# Patient Record
Sex: MALE | Race: BLACK OR AFRICAN AMERICAN | NOT HISPANIC OR LATINO | Employment: OTHER | URBAN - METROPOLITAN AREA
[De-identification: names, ages, dates, MRNs, and addresses within clinical notes are randomized per-mention and may not be internally consistent; named-entity substitution may affect disease eponyms.]

---

## 2024-07-12 ENCOUNTER — APPOINTMENT (OUTPATIENT)
Dept: GENERAL RADIOLOGY | Facility: CLINIC | Age: 29
End: 2024-07-12
Attending: EMERGENCY MEDICINE
Payer: COMMERCIAL

## 2024-07-12 ENCOUNTER — HOSPITAL ENCOUNTER (EMERGENCY)
Facility: CLINIC | Age: 29
Discharge: HOME | End: 2024-07-13
Attending: EMERGENCY MEDICINE | Admitting: EMERGENCY MEDICINE
Payer: COMMERCIAL

## 2024-07-12 DIAGNOSIS — S29.012A MUSCLE STRAIN OF LEFT UPPER BACK, INITIAL ENCOUNTER: ICD-10-CM

## 2024-07-12 DIAGNOSIS — M62.830 BACK MUSCLE SPASM: ICD-10-CM

## 2024-07-12 PROCEDURE — 99283 EMERGENCY DEPT VISIT LOW MDM: CPT | Mod: 25

## 2024-07-12 PROCEDURE — 250N000013 HC RX MED GY IP 250 OP 250 PS 637: Performed by: EMERGENCY MEDICINE

## 2024-07-12 PROCEDURE — 71046 X-RAY EXAM CHEST 2 VIEWS: CPT

## 2024-07-12 RX ORDER — IBUPROFEN 600 MG/1
600 TABLET, FILM COATED ORAL ONCE
Status: COMPLETED | OUTPATIENT
Start: 2024-07-12 | End: 2024-07-12

## 2024-07-12 RX ORDER — LIDOCAINE 4 G/G
1 PATCH TOPICAL ONCE
Status: DISCONTINUED | OUTPATIENT
Start: 2024-07-12 | End: 2024-07-13 | Stop reason: HOSPADM

## 2024-07-12 RX ADMIN — LIDOCAINE 1 PATCH: 4 PATCH TOPICAL at 23:34

## 2024-07-12 RX ADMIN — IBUPROFEN 600 MG: 600 TABLET, FILM COATED ORAL at 20:53

## 2024-07-12 ASSESSMENT — COLUMBIA-SUICIDE SEVERITY RATING SCALE - C-SSRS
2. HAVE YOU ACTUALLY HAD ANY THOUGHTS OF KILLING YOURSELF IN THE PAST MONTH?: NO
1. IN THE PAST MONTH, HAVE YOU WISHED YOU WERE DEAD OR WISHED YOU COULD GO TO SLEEP AND NOT WAKE UP?: NO
6. HAVE YOU EVER DONE ANYTHING, STARTED TO DO ANYTHING, OR PREPARED TO DO ANYTHING TO END YOUR LIFE?: NO

## 2024-07-12 ASSESSMENT — ACTIVITIES OF DAILY LIVING (ADL): ADLS_ACUITY_SCORE: 33

## 2024-07-12 NOTE — Clinical Note
Pratima Andrew was seen and treated in our emergency department on 7/12/2024.  He may return to work on 07/15/2024.  No heavy lifting of over 10lbs or repetitive twisting for one week.     If you have any questions or concerns, please don't hesitate to call.      Tayler Barnes MD

## 2024-07-13 VITALS
WEIGHT: 165.34 LBS | SYSTOLIC BLOOD PRESSURE: 129 MMHG | RESPIRATION RATE: 18 BRPM | DIASTOLIC BLOOD PRESSURE: 78 MMHG | OXYGEN SATURATION: 97 % | BODY MASS INDEX: 25.06 KG/M2 | TEMPERATURE: 98 F | HEART RATE: 82 BPM | HEIGHT: 68 IN
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
## 2024-07-13 NOTE — DISCHARGE INSTRUCTIONS
Use ibuprofen (with food) 600mg every 6 hours as needed for pain. You may also use Tylenol 1000 mg up to 4 times a day as needed for pain.  Use ice as this may help with inflammation.  Consider over-the-counter pain relieving ointments as needed.

## 2025-04-15 ENCOUNTER — OFFICE VISIT (OUTPATIENT)
Dept: URGENT CARE | Facility: URGENT CARE | Age: 30
End: 2025-04-15
Payer: COMMERCIAL

## 2025-04-15 VITALS
RESPIRATION RATE: 16 BRPM | OXYGEN SATURATION: 100 % | SYSTOLIC BLOOD PRESSURE: 114 MMHG | DIASTOLIC BLOOD PRESSURE: 77 MMHG | BODY MASS INDEX: 24.55 KG/M2 | WEIGHT: 162 LBS | TEMPERATURE: 98.4 F | HEART RATE: 80 BPM

## 2025-04-15 DIAGNOSIS — J02.9 ACUTE VIRAL PHARYNGITIS: ICD-10-CM

## 2025-04-15 DIAGNOSIS — R68.89 FLU-LIKE SYMPTOMS: Primary | ICD-10-CM

## 2025-04-15 LAB
DEPRECATED S PYO AG THROAT QL EIA: NEGATIVE
FLUAV AG SPEC QL IA: NEGATIVE
FLUBV AG SPEC QL IA: NEGATIVE
S PYO DNA THROAT QL NAA+PROBE: NOT DETECTED

## 2025-04-15 PROCEDURE — 3074F SYST BP LT 130 MM HG: CPT | Performed by: STUDENT IN AN ORGANIZED HEALTH CARE EDUCATION/TRAINING PROGRAM

## 2025-04-15 PROCEDURE — 99203 OFFICE O/P NEW LOW 30 MIN: CPT | Performed by: STUDENT IN AN ORGANIZED HEALTH CARE EDUCATION/TRAINING PROGRAM

## 2025-04-15 PROCEDURE — 87651 STREP A DNA AMP PROBE: CPT | Performed by: STUDENT IN AN ORGANIZED HEALTH CARE EDUCATION/TRAINING PROGRAM

## 2025-04-15 PROCEDURE — 3078F DIAST BP <80 MM HG: CPT | Performed by: STUDENT IN AN ORGANIZED HEALTH CARE EDUCATION/TRAINING PROGRAM

## 2025-04-15 PROCEDURE — 87804 INFLUENZA ASSAY W/OPTIC: CPT | Performed by: PHYSICIAN ASSISTANT

## 2025-04-15 NOTE — PROGRESS NOTES
Assessment & Plan     Flu-like symptoms  Acute viral pharyngitis  Presented with 3 days of body aches, sore throat, cough, headache, objective fevers.  No difficulty breathing.  Rapid flu and strep negative.  Discussed likely viral infection.  Of note patient was treated for latent TB, completed full course several years ago.  Without fever or respiratory symptoms did not order chest x-ray.  -Tylenol and ibuprofen as needed  -Cough drops for cough  -Return precautions discussed             No follow-ups on file.    Elliott Huffman MD  [facility] Andujar is a 30 year old male who presents to clinic today for the following health issues:  Chief Complaint   Patient presents with    Pharyngitis     3 days, cough, fever, body aches, fatigue     Symptoms started 3 days ago.    Body aches, weakness, sore throat, mild cough, headache. Subjective fevers, did not check. Sweating at night. Did have a streak of blood in coughed up sputum, one time.     No N/V/D.     Breathing is fine.     No known sick contacts.     Taking tylenol. No tylenol yet today.     No medical conditions or medications. Previously treated for latent TB.             4/15/2025    11:11 AM   Additional Questions   Roomed by mailssa vidal           Review of Systems        Objective    /77 (BP Location: Right arm, Patient Position: Sitting, Cuff Size: Adult Regular)   Pulse 80   Temp 98.4  F (36.9  C) (Tympanic)   Resp 16   Wt 73.5 kg (162 lb)   SpO2 100%   BMI 24.55 kg/m    Physical Exam   GENERAL: alert and no distress  EYES: Eyes grossly normal to inspection, PERRL and conjunctivae and sclerae normal  HENT: nose and mouth without ulcers or lesions  NECK: no adenopathy, no asymmetry, masses, or scars  RESP: lungs clear to auscultation - no rales, rhonchi or wheezes  CV: regular rate and rhythm, normal S1 S2, no S3 or S4, no murmur, click or rub, no peripheral edema  ABDOMEN: soft, nontender, no  hepatosplenomegaly, no masses   MS: no gross musculoskeletal defects noted, no edema  SKIN: no suspicious lesions or rashes  NEURO:  mentation intact and speech normal  PSYCH: mentation appears normal, affect normal/bright

## 2025-04-15 NOTE — LETTER
4/15/2025    Pratima Andrew   1995        To Whom it May Concern;    Pratima Andrew was seen in clinic on April 15, 2025 for flu-like symptoms. He likely has a viral respiratory infection. Please excuse him from work starting  through when he is fever free for 24 hours to prevent him infecting others. This will likely be in the next 2-3 days.       Sincerely,        Elliott Huffman MD

## 2025-04-15 NOTE — PATIENT INSTRUCTIONS
Great to meet you in clinic!      Flu like symptoms  Sore throat  - Likely respiratory virus  - Continue tylenol and ibuprofen  - Cough drops as needed  - Stay hydrated, drink water  - Reason to come back and be evaluated    - Difficulty breathing   - Coughing up blood   - Not improving in 4-5 days, though cough can linger

## 2025-04-15 NOTE — PROGRESS NOTES
Urgent Care Clinic Visit    Chief Complaint   Patient presents with    Pharyngitis     3 days, cough, fever, body aches, fatigue               4/15/2025    11:11 AM   Additional Questions   Roomed by malissa vidal     No  Does the patient have a sore throat and either history of fever >100.4 in the previous 24 hours without a cough or recent exposure to a known case of strep throat? Yes   Pre-Provider Visit Orders- Influenza  Is this currently Influenza testing season?:  Yes  Does the patient present with a fever and either bodyaches, fatigue, or cough that have been present less than 48 hours? Yes